# Patient Record
Sex: MALE | Race: AMERICAN INDIAN OR ALASKA NATIVE
[De-identification: names, ages, dates, MRNs, and addresses within clinical notes are randomized per-mention and may not be internally consistent; named-entity substitution may affect disease eponyms.]

---

## 2017-08-04 ENCOUNTER — HOSPITAL ENCOUNTER (EMERGENCY)
Dept: HOSPITAL 56 - MW.ED | Age: 47
Discharge: HOME | End: 2017-08-04
Payer: COMMERCIAL

## 2017-08-04 VITALS — DIASTOLIC BLOOD PRESSURE: 88 MMHG | SYSTOLIC BLOOD PRESSURE: 131 MMHG
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
## 2017-08-04 DIAGNOSIS — S20.212A: Primary | ICD-10-CM

## 2017-08-04 DIAGNOSIS — I10: ICD-10-CM

## 2017-08-04 DIAGNOSIS — F41.9: ICD-10-CM

## 2017-08-04 PROCEDURE — 96372 THER/PROPH/DIAG INJ SC/IM: CPT

## 2017-08-04 PROCEDURE — 71101 X-RAY EXAM UNILAT RIBS/CHEST: CPT

## 2017-08-04 PROCEDURE — 99283 EMERGENCY DEPT VISIT LOW MDM: CPT

## 2017-08-04 NOTE — CR
EXAMINATION: PA chest radiograph and left ribs

 

HISTORY: Pain.

 

FINDINGS: 

The trachea is midline. The cardiomediastinal silhouette is within normal limits. No pulmonary infil
trates, effusions or pneumothorax.

 

Osseous structures appear unremarkable. No displaced rib fracture identified.

 

IMPRESSION: 

No acute cardiopulmonary process.
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
## 2017-08-06 ENCOUNTER — HOSPITAL ENCOUNTER (EMERGENCY)
Dept: HOSPITAL 56 - MW.ED | Age: 47
LOS: 1 days | Discharge: HOME | End: 2017-08-07
Payer: MEDICAID

## 2017-08-06 DIAGNOSIS — I10: ICD-10-CM

## 2017-08-06 DIAGNOSIS — S29.9XXA: ICD-10-CM

## 2017-08-06 DIAGNOSIS — S30.1XXA: Primary | ICD-10-CM
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
## 2017-08-06 LAB
CHLORIDE SERPL-SCNC: 104 MMOL/L (ref 98–110)
SODIUM SERPL-SCNC: 140 MMOL/L (ref 136–146)

## 2017-08-06 PROCEDURE — 71010: CPT

## 2017-08-06 PROCEDURE — 99284 EMERGENCY DEPT VISIT MOD MDM: CPT

## 2017-08-06 PROCEDURE — 85025 COMPLETE CBC W/AUTO DIFF WBC: CPT

## 2017-08-06 PROCEDURE — 71100 X-RAY EXAM RIBS UNI 2 VIEWS: CPT

## 2017-08-06 PROCEDURE — 96361 HYDRATE IV INFUSION ADD-ON: CPT

## 2017-08-06 PROCEDURE — 96375 TX/PRO/DX INJ NEW DRUG ADDON: CPT

## 2017-08-06 PROCEDURE — 74177 CT ABD & PELVIS W/CONTRAST: CPT

## 2017-08-06 PROCEDURE — 80053 COMPREHEN METABOLIC PANEL: CPT

## 2017-08-06 PROCEDURE — 96374 THER/PROPH/DIAG INJ IV PUSH: CPT

## 2017-08-06 PROCEDURE — 83690 ASSAY OF LIPASE: CPT

## 2017-08-06 PROCEDURE — 81001 URINALYSIS AUTO W/SCOPE: CPT
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
## 2017-08-07 VITALS — DIASTOLIC BLOOD PRESSURE: 79 MMHG | SYSTOLIC BLOOD PRESSURE: 170 MMHG

## 2017-08-07 NOTE — CR
EXAM DATE: 17



PATIENT'S AGE: 46





Patient: SHER REYNOLDS



Facility: [place], [place]

Patient ID: [id number]

Site Patient ID: [id number].

Site Accession #: [id number]

: 1970

Study: XRay Chest [id number]/7/2017 1:43:48 AM

Ordering Physician: Tha Valdez



Final Report: 

INDICATION: LUQ INJURY 3 DAYS



CHEST, ONE VIEW



An AP radiograph of the chest was performed. 



Comparison: 2017.



The lungs appear clear and no pleural effusions are identified. The 
cardiomediastinal silhouette and pulmonary vasculature appear normal, as do the 
visualized bones.



IMPRESSION: No acute intrathoracic abnormality identified.



KATE QUIJANO MD

[facility]













Dictated by: Demario Quijano MD @ 2017 01:47:10

(Electronic Signature)



Report Signed by Proxy.
[facility]

## 2017-08-07 NOTE — CT
EXAM DATE: 17



PATIENT'S AGE: 46





Patient: SHER REYNOLDS



Facility: [place], [place]

Patient ID: [id number]

Site Patient ID: [id number].

Site Accession #: [id number]

: 1970

Study: CT Abdomen/Pelvis [id number]/7/2017 12:24:51 AM

Ordering Physician: Tha Valdez



Final Report: 

:

INDICATION: INJURY TO LUQ 3 DAYS AGO



CT ABDOMEN AND PELVIS WITH CONTRAST



TECHNIQUE: Multidetector CT imaging was performed through the abdomen and 
pelvis following intravenous contrast administration using 100 mL of Isovue 
370. Coronal and sagittal reconstructions were generated.



COMPARISON: 05/15/2017 CT abdomen and pelvis.



FINDINGS:



Lower chest: Mild bibasilar lung stranding consistent with atelectasis.



Liver: Fatty infiltration of the liver. 



Gallbladder and bile ducts: No gallbladder wall thickening or calcified 
gallstones. No biliary dilation identified.



Pancreas: Unremarkable.



Spleen: Normal.



Adrenals: No nodules or masses.



Kidneys, ureters, and urinary bladder: Tiny right renal cortical cysts. No 
renal masses or hydronephrosis. No bladder mass or definite wall thickening.



Gastrointestinal tract: Normal caliber bowel without wall thickening. The 
appendix is normal.



Vascular structures: Normal for age.



Peritoneum: No free air, abscess, or significant free fluid.



Lymph nodes: No pathologically enlarged nodes identified.



Reproductive organs: No pelvic masses.



Bones: No acute fracture identified. Incidental Schmorl`s node in the superior 
endplate of T11, as before.



IMPRESSION:

1. No acute abnormality identified. No fractures are seen.

2. Fatty infiltration of liver. 



KATE QUIJANO MD

[facility]



Dictated by Demario Quijano MD @ 2017 12:50:31 AM





Dictated by: Demario Quijano MD @ 2017 00:50:49

(Electronic Signature)



Report Signed by Proxy.
[facility]

## 2017-08-08 NOTE — CR
EXAM DATE: 17



PATIENT'S AGE: 46





Patient: SHER REYNOLDS



Facility: [place], [place]

Patient ID: [id number]

Site Patient ID: [id number]

Site Accession #: [id number]

: 1970

Study: XRay Chest Left Ribs [id number]/6/2017 4:09:44 PM

Ordering Physician: JIGNA TOLEDO 



Final Report: 

INDICATION: PAIN



FINDINGS:

Three views of the right ribs show no evidence of a rib fracture. No other bony 
or soft tissue abnormalities identified.



Dictated by Henrique Garcia MD @ 2017 4:22:07 PM





Dictated by: Henrique Garcia MD @ 2017 16:22:13

(Electronic Signature)

[facility]

## 2017-12-23 ENCOUNTER — HOSPITAL ENCOUNTER (EMERGENCY)
Dept: HOSPITAL 56 - MW.ED | Age: 47
Discharge: HOME | End: 2017-12-23
Payer: MEDICAID

## 2017-12-23 VITALS — DIASTOLIC BLOOD PRESSURE: 79 MMHG | SYSTOLIC BLOOD PRESSURE: 142 MMHG

## 2017-12-23 DIAGNOSIS — M62.838: Primary | ICD-10-CM

## 2017-12-23 DIAGNOSIS — Z79.899: ICD-10-CM

## 2017-12-23 DIAGNOSIS — I10: ICD-10-CM

## 2017-12-23 LAB
CHLORIDE SERPL-SCNC: 105 MMOL/L (ref 98–110)
SODIUM SERPL-SCNC: 139 MMOL/L (ref 136–146)

## 2017-12-23 PROCEDURE — 99285 EMERGENCY DEPT VISIT HI MDM: CPT

## 2017-12-23 PROCEDURE — 96360 HYDRATION IV INFUSION INIT: CPT

## 2017-12-23 PROCEDURE — 80053 COMPREHEN METABOLIC PANEL: CPT

## 2017-12-23 PROCEDURE — 71010: CPT

## 2017-12-23 PROCEDURE — 83690 ASSAY OF LIPASE: CPT

## 2017-12-23 PROCEDURE — 82150 ASSAY OF AMYLASE: CPT

## 2017-12-23 PROCEDURE — 85025 COMPLETE CBC W/AUTO DIFF WBC: CPT

## 2017-12-23 PROCEDURE — 81001 URINALYSIS AUTO W/SCOPE: CPT

## 2017-12-23 PROCEDURE — 84484 ASSAY OF TROPONIN QUANT: CPT

## 2017-12-23 PROCEDURE — G0480 DRUG TEST DEF 1-7 CLASSES: HCPCS

## 2017-12-23 NOTE — EDM.PDOC
ED HPI GENERAL MEDICAL PROBLEM





- General


Stated Complaint: LEFT SHOULDER PAIN


Time Seen by Provider: 12/23/17 05:10


Source of Information: Reports: Patient





- History of Present Illness


INITIAL COMMENTS - FREE TEXT/NARRATIVE: 





HISTORY AND PHYSICAL:


History of present illness:


[Patient presents with 2 days of 4 out of 10 left shoulder girdle pain, it does 

radiate across his chest today, it is reproducible, I can reproduce symptoms 

with palpation and movement of the left arm, patient works in packaging for the 

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 

I can reproduce pain with palpation of the infraspinatus as well as thoracic 

paraspinous muscles on the left and pectoralis major on the left. No 

association with shortness of breath or diaphoresis patient does feel under the 

weather however generally weak with a wobbly gait.





Some mild nausea no vomiting no fever chills sweats no headache dizziness or 

palpitation no bowel or urine symptoms





Denies cardiac history





Denies smoking or alcohol use





He does take lisinopril for hypertension





]


Review of systems: 


As per history of present illness and below otherwise all systems reviewed and 

negative.


Past medical history: 


As per history of present illness and as reviewed below otherwise 

noncontributory.


Surgical history: 


As per history of present illness and as reviewed below otherwise 

noncontributory.


Social history: 


No reported history of drug or alcohol abuse.


Family history: 


As per history of present illness and as reviewed below otherwise 

noncontributory.


Physical exam:


HEENT: Atraumatic, normocephalic, pupils reactive, negative for conjunctival 

pallor or scleral icterus, mucous membranes moist, throat clear, neck supple, 

nontender, trachea midline.


Lungs: Clear to auscultation, breath sounds equal bilaterally, chest nontender.


Heart: S1S2, regular, negative for clicks, rubs, or JVD.


Abdomen: Soft, nondistended, nontender. Negative for masses or 

hepatosplenomegaly. Negative for costovertebral tenderness.


Pelvis: Stable nontender.


Genitourinary: Deferred.


Rectal: Deferred.


Extremities: Atraumatic, negative for cords or calf pain. Neurovascular 

unremarkable.


Neuro: Awake, alert, oriented. Cranial nerves II through XII unremarkable. 

Cerebellum unremarkable. Motor and sensory unremarkable throughout. Exam 

nonfocal.


Diagnostics:


[CBC CMP UA troponin


EKG


Chest 1 view





]


Therapeutics:


[Liter normal saline bolus


Aspirin 324 mg chewable


Nitroglycerin 0.4 mg sublingual--not provided





] Due to wobbly gait I did offer the patient admission for observation he 

refused stating he would rather go home and return as needed








Impression: 


Muscle spasm left shoulder girdle





Definitive disposition and diagnosis as appropriate pending reevaluation and 

review of above.


  ** left shoulder/chest


Pain Score (Numeric/FACES): 8





- Related Data


 Allergies











Allergy/AdvReac Type Severity Reaction Status Date / Time


 


COCA COLA Allergy  Hives Uncoded 12/23/17 05:10











Home Meds: 


 Home Meds





Lisinopril [Prinivil] 10 mg PO DAILY 09/12/15 [History]


Ibuprofen [Ibuprofen] 1 tab PO ASDIRECTED PRN 12/23/17 [History]











Past Medical History


HEENT History: Reports: None


Cardiovascular History: Reports: Hypertension


Gastrointestinal History: Reports: Bowel Obstruction


Genitourinary History: Reports: Other (See Below)


Other Genitourinary History: hematuria once, none since


Musculoskeletal History: Reports: None


Neurological History: Reports: None


Psychiatric History: Reports: Anxiety


Endocrine/Metabolic History: Reports: None


Hematologic History: Reports: None


Immunologic History: Reports: None


Oncologic (Cancer) History: Reports: None


Dermatologic History: Reports: None





- Infectious Disease History


Infectious Disease History: Reports: None





- Past Surgical History


Head Surgeries/Procedures: Reports: None


Male  Surgical History: Reports: None





Social & Family History





- Family History


Family Medical History: Noncontributory


Cardiac: Reports: MI


Respiratory: Reports: None


GI: Reports: None


Endocrine/Metabolic: Reports: Diabetes, type II


Oncologic: Reports: Colon





- Tobacco Use


Smoking Status *Q: Never Smoker


Second Hand Smoke Exposure: No





- Caffeine Use


Caffeine Use: Reports: Coffee, Soda





- Recreational Drug Use


Recreational Drug Use: No





ED ROS GENERAL





- Review of Systems


Review Of Systems: ROS reveals no pertinent complaints other than HPI.





ED EXAM, GENERAL





- Physical Exam


Exam: See Below





Course





- Vital Signs


Last Recorded V/S: 


 Last Vital Signs











Temp  97.6 F   12/23/17 05:11


 


Pulse  65   12/23/17 05:11


 


Resp  18   12/23/17 05:11


 


BP  122/76   12/23/17 05:11


 


Pulse Ox  97   12/23/17 05:11














- Orders/Labs/Meds


Orders: 


 Active Orders 24 hr











 Category Date Time Status


 


 EKG Documentation Completion [RC] AM Care  12/23/17 05:09 Active


 


 Chest 1V Frontal [CR] Stat Exams  12/23/17 05:09 Taken


 


 Nitroglycerin [Nitrostat] Med  12/23/17 05:09 Active





 0.4 mg SL Q5M PRN   








 Medication Orders





Nitroglycerin (Nitrostat)  0.4 mg SL Q5M PRN


   PRN Reason: Chest Pain








Labs: 


 Laboratory Tests











  12/23/17 12/23/17 12/23/17 Range/Units





  05:13 05:13 05:25 


 


WBC  7.76    (4.0-11.0)  K/uL


 


RBC  4.84    (4.50-5.90)  M/uL


 


Hgb  14.3    (13.0-17.0)  g/dL


 


Hct  42.9    (38.0-50.0)  %


 


MCV  88.6    (80.0-98.0)  fL


 


MCH  29.5    (27.0-32.0)  pg


 


MCHC  33.3    (31.0-37.0)  g/dL


 


RDW Std Deviation  45.8    (28.0-62.0)  fl


 


RDW Coeff of Vin  14    (11.0-15.0)  %


 


Plt Count  206    (150-400)  K/uL


 


MPV  9.90    (7.40-12.00)  fL


 


Neut % (Auto)  59.6    (48.0-80.0)  %


 


Lymph % (Auto)  28.4    (16.0-40.0)  %


 


Mono % (Auto)  9.4    (0.0-15.0)  %


 


Eos % (Auto)  2.1    (0.0-7.0)  %


 


Baso % (Auto)  0.5    (0.0-1.5)  %


 


Neut # (Auto)  4.6    (1.4-5.7)  K/uL


 


Lymph # (Auto)  2.2    (0.6-2.4)  K/uL


 


Mono # (Auto)  0.7    (0.0-0.8)  K/uL


 


Eos # (Auto)  0.2    (0.0-0.7)  K/uL


 


Baso # (Auto)  0.0    (0.0-0.1)  K/uL


 


Nucleated RBC %  0.0    /100WBC


 


Nucleated RBCs #  0    K/uL


 


Sodium   139   (136-146)  mmol/L


 


Potassium   3.6   (3.5-5.1)  mmol/L


 


Chloride   105   ()  mmol/L


 


Carbon Dioxide   25   (21-31)  mmol/L


 


BUN   16   (6.0-23.0)  mg/dL


 


Creatinine   0.8   (0.6-1.5)  mg/dL


 


Est Cr Clr Drug Dosing   126.23   mL/min


 


Estimated GFR (MDRD)   > 60.0   ml/min


 


Glucose   123 H   ()  mg/dL


 


Calcium   9.0   (8.8-10.8)  mg/dL


 


Total Bilirubin   0.9   (0.1-1.5)  mg/dL


 


AST   15   (5-40)  IU/L


 


ALT   14   (8-54)  IU/L


 


Alkaline Phosphatase   102   ()  


 


Troponin I   < 0.10   (0.0-0.29)  NG/ML


 


Total Protein   7.0   (6.0-8.0)  g/dL


 


Albumin   4.1   (3.5-5.0)  g/dL


 


Globulin   2.9   (2.0-3.5)  g/dL


 


Albumin/Globulin Ratio   1.4   (1.3-2.8)  


 


Amylase   19   (10-90)  U/L


 


Lipase   23   (7-80)  U/L


 


Urine Color    YELLOW  


 


Urine Appearance    CLEAR  


 


Urine pH    6.0  (5.0-8.0)  


 


Ur Specific Gravity    1.025  (1.001-1.035)  


 


Urine Protein    NEGATIVE  (NEGATIVE)  mg/dL


 


Urine Glucose (UA)    NEGATIVE  (NEGATIVE)  mg/dL


 


Urine Ketones    NEGATIVE  (NEGATIVE)  mg/dL


 


Urine Occult Blood    MODERATE  (NEGATIVE)  


 


Urine Nitrite    NEGATIVE  (NEGATIVE)  


 


Urine Bilirubin    NEGATIVE  (NEGATIVE)  


 


Urine Urobilinogen    4.0 H  (<2.0)  EU/dL


 


Ur Leukocyte Esterase    NEGATIVE  (NEGATIVE)  


 


Urine RBC    2-5  (0-2/HPF)  


 


Urine WBC    1-2  (0-5/HPF)  


 


Ur Epithelial Cells    FEW  (NONE-FEW)  


 


Urine Bacteria    FEW  (NEGATIVE)  


 


Urine Mucus    FEW  (NONE-MOD)  


 


Ethyl Alcohol     mg/dL














  12/23/17 Range/Units





  05:31 


 


WBC   (4.0-11.0)  K/uL


 


RBC   (4.50-5.90)  M/uL


 


Hgb   (13.0-17.0)  g/dL


 


Hct   (38.0-50.0)  %


 


MCV   (80.0-98.0)  fL


 


MCH   (27.0-32.0)  pg


 


MCHC   (31.0-37.0)  g/dL


 


RDW Std Deviation   (28.0-62.0)  fl


 


RDW Coeff of Vin   (11.0-15.0)  %


 


Plt Count   (150-400)  K/uL


 


MPV   (7.40-12.00)  fL


 


Neut % (Auto)   (48.0-80.0)  %


 


Lymph % (Auto)   (16.0-40.0)  %


 


Mono % (Auto)   (0.0-15.0)  %


 


Eos % (Auto)   (0.0-7.0)  %


 


Baso % (Auto)   (0.0-1.5)  %


 


Neut # (Auto)   (1.4-5.7)  K/uL


 


Lymph # (Auto)   (0.6-2.4)  K/uL


 


Mono # (Auto)   (0.0-0.8)  K/uL


 


Eos # (Auto)   (0.0-0.7)  K/uL


 


Baso # (Auto)   (0.0-0.1)  K/uL


 


Nucleated RBC %   /100WBC


 


Nucleated RBCs #   K/uL


 


Sodium   (136-146)  mmol/L


 


Potassium   (3.5-5.1)  mmol/L


 


Chloride   ()  mmol/L


 


Carbon Dioxide   (21-31)  mmol/L


 


BUN   (6.0-23.0)  mg/dL


 


Creatinine   (0.6-1.5)  mg/dL


 


Est Cr Clr Drug Dosing   mL/min


 


Estimated GFR (MDRD)   ml/min


 


Glucose   ()  mg/dL


 


Calcium   (8.8-10.8)  mg/dL


 


Total Bilirubin   (0.1-1.5)  mg/dL


 


AST   (5-40)  IU/L


 


ALT   (8-54)  IU/L


 


Alkaline Phosphatase   ()  


 


Troponin I   (0.0-0.29)  NG/ML


 


Total Protein   (6.0-8.0)  g/dL


 


Albumin   (3.5-5.0)  g/dL


 


Globulin   (2.0-3.5)  g/dL


 


Albumin/Globulin Ratio   (1.3-2.8)  


 


Amylase   (10-90)  U/L


 


Lipase   (7-80)  U/L


 


Urine Color   


 


Urine Appearance   


 


Urine pH   (5.0-8.0)  


 


Ur Specific Gravity   (1.001-1.035)  


 


Urine Protein   (NEGATIVE)  mg/dL


 


Urine Glucose (UA)   (NEGATIVE)  mg/dL


 


Urine Ketones   (NEGATIVE)  mg/dL


 


Urine Occult Blood   (NEGATIVE)  


 


Urine Nitrite   (NEGATIVE)  


 


Urine Bilirubin   (NEGATIVE)  


 


Urine Urobilinogen   (<2.0)  EU/dL


 


Ur Leukocyte Esterase   (NEGATIVE)  


 


Urine RBC   (0-2/HPF)  


 


Urine WBC   (0-5/HPF)  


 


Ur Epithelial Cells   (NONE-FEW)  


 


Urine Bacteria   (NEGATIVE)  


 


Urine Mucus   (NONE-MOD)  


 


Ethyl Alcohol  < 10.0  mg/dL











Meds: 


Medications











Generic Name Dose Route Start Last Admin





  Trade Name Freq  PRN Reason Stop Dose Admin


 


Nitroglycerin  0.4 mg  12/23/17 05:09  





  Nitrostat  SL   





  Q5M PRN   





  Chest Pain   














Discontinued Medications














Generic Name Dose Route Start Last Admin





  Trade Name Freq  PRN Reason Stop Dose Admin


 


Aspirin  324 mg  12/23/17 05:09  12/23/17 05:17





  Aspirin  PO  12/23/17 05:10  324 mg





  ONETIME ONE   Administration


 


Sodium Chloride  1,000 mls @ 999 mls/hr  12/23/17 05:09  12/23/17 05:17





  Normal Saline  IV  12/23/17 06:09  999 mls/hr





  STAT ONE   Administration














Departure





- Departure


Time of Disposition: 06:36


Disposition: Home, Self-Care 01


Condition: Good


Clinical Impression: 


 Muscle spasm








- Discharge Information


Additional Instructions: 


Return if symptoms persist or worsen or new concerning symptoms develop


Follow-up with primary care in 2 weeks sooner as needed





The following information is given to patients seen in the emergency department 

who are being discharged to home. This information is to outline your options 

for follow-up care. We provide all patients seen in our emergency department 

with a follow-up referral.





The need for follow-up, as well as the timing and circumstances, are variable 

depending upon the specifics of your emergency department visit.





If you don't have a primary care physician on staff, we will provide you with a 

referral. We always advise you to contact your personal physician following an 

emergency department visit to inform them of the circumstance of the visit and 

for follow-up with them and/or the need for any referrals to a consulting 

specialist.





The emergency department will also refer you to a specialist when appropriate. 

This referral assures that you have the opportunity for follow-up care with a 

specialist. All of these measure are taken in an effort to provide you with 

optimal care, which includes your follow-up.





Under all circumstances we always encourage you to contact your private 

physician who remains a resource for coordinating your care. When calling for 

follow-up care, please make the office aware that this follow-up is from your 

recent emergency room visit. If for any reason you are refused follow-up, 

please contact the [facility] emergency department at [phone] 

and asked to speak to the emergency department charge nurse.








- My Orders


Last 24 Hours: 


My Active Orders





12/23/17 05:09


EKG Documentation Completion [RC] AM 


Chest 1V Frontal [CR] Stat 


Nitroglycerin [Nitrostat]   0.4 mg SL Q5M PRN 














- Assessment/Plan


Last 24 Hours: 


My Active Orders





12/23/17 05:09


EKG Documentation Completion [RC] AM 


Chest 1V Frontal [CR] Stat 


Nitroglycerin [Nitrostat]   0.4 mg SL Q5M PRN

## 2017-12-26 NOTE — CR
EXAM DATE: 17



PATIENT'S AGE: 47





Patient: SHER REYNOLDS



Facility: [place], [place]

Patient ID: [id number]

Site Patient ID: [id number].

Site Accession #: [id number]

: 1970

Study: XRay Chest [id number]/23/2017 5:25:55 AM

Ordering Physician: Doctor Neal



Final Report: 

INDICATION:

Pain 



TECHNIQUE:

Chest 1 views



COMPARISON:

2017 



FINDINGS:

Cardiovascular and mediastinum: Heart size and vasculature are normal in 
caliber and appearance. 

Lungs and pleural spaces: Lungs are clear. No sign of infiltrate or mass. No 
sign of pleural effusion. No pneumothorax. 

Bones and soft tissues: No significant findings.



IMPRESSION:

No acute findings and no significant changes from the prior exam.



Dictated by Renard Call MD @ 2017 5:34:57 AM







Dictated by: Renard Call MD @ 2017 05:35:03

(Electronic Signature)



Report Signed by Proxy.
DESIREE

## 2022-08-24 ENCOUNTER — HOSPITAL ENCOUNTER (EMERGENCY)
Dept: HOSPITAL 56 - MW.ED | Age: 52
LOS: 1 days | Discharge: HOME | End: 2022-08-25
Payer: SELF-PAY

## 2022-08-24 DIAGNOSIS — Z20.822: ICD-10-CM

## 2022-08-24 DIAGNOSIS — Z79.899: ICD-10-CM

## 2022-08-24 DIAGNOSIS — K59.00: Primary | ICD-10-CM

## 2022-08-24 DIAGNOSIS — Z91.048: ICD-10-CM

## 2022-08-24 DIAGNOSIS — I10: ICD-10-CM

## 2022-08-24 PROCEDURE — 96374 THER/PROPH/DIAG INJ IV PUSH: CPT

## 2022-08-24 PROCEDURE — 99284 EMERGENCY DEPT VISIT MOD MDM: CPT

## 2022-08-24 PROCEDURE — 81001 URINALYSIS AUTO W/SCOPE: CPT

## 2022-08-24 PROCEDURE — 74177 CT ABD & PELVIS W/CONTRAST: CPT

## 2022-08-24 PROCEDURE — 36415 COLL VENOUS BLD VENIPUNCTURE: CPT

## 2022-08-24 PROCEDURE — U0002 COVID-19 LAB TEST NON-CDC: HCPCS

## 2022-08-24 PROCEDURE — 87635 SARS-COV-2 COVID-19 AMP PRB: CPT

## 2022-08-24 PROCEDURE — 96361 HYDRATE IV INFUSION ADD-ON: CPT

## 2022-08-24 PROCEDURE — 85025 COMPLETE CBC W/AUTO DIFF WBC: CPT

## 2022-08-24 PROCEDURE — 80053 COMPREHEN METABOLIC PANEL: CPT

## 2022-08-25 VITALS — HEART RATE: 87 BPM | SYSTOLIC BLOOD PRESSURE: 131 MMHG | DIASTOLIC BLOOD PRESSURE: 95 MMHG

## 2022-08-25 LAB
BUN SERPL-MCNC: 13 MG/DL (ref 7–18)
CHLORIDE SERPL-SCNC: 101 MMOL/L (ref 98–107)
CO2 SERPL-SCNC: 26.9 MMOL/L (ref 21–32)
EGFRCR SERPLBLD CKD-EPI 2021: 90 ML/MIN (ref 60–?)
GLUCOSE SERPL-MCNC: 142 MG/DL (ref 74–106)
POTASSIUM SERPL-SCNC: 3.7 MMOL/L (ref 3.5–5.1)
SODIUM SERPL-SCNC: 137 MMOL/L (ref 136–148)

## 2022-09-30 ENCOUNTER — HOSPITAL ENCOUNTER (EMERGENCY)
Dept: HOSPITAL 56 - MW.ED | Age: 52
Discharge: HOME | End: 2022-09-30
Payer: SELF-PAY

## 2022-09-30 VITALS — HEART RATE: 60 BPM | DIASTOLIC BLOOD PRESSURE: 83 MMHG | SYSTOLIC BLOOD PRESSURE: 140 MMHG

## 2022-09-30 DIAGNOSIS — Z91.018: ICD-10-CM

## 2022-09-30 DIAGNOSIS — J01.00: Primary | ICD-10-CM

## 2022-09-30 DIAGNOSIS — Z79.899: ICD-10-CM

## 2022-09-30 DIAGNOSIS — I10: ICD-10-CM

## 2022-11-07 ENCOUNTER — HOSPITAL ENCOUNTER (EMERGENCY)
Dept: HOSPITAL 56 - MW.ED | Age: 52
Discharge: HOME | End: 2022-11-07
Payer: COMMERCIAL

## 2022-11-07 VITALS — SYSTOLIC BLOOD PRESSURE: 145 MMHG | DIASTOLIC BLOOD PRESSURE: 96 MMHG | HEART RATE: 68 BPM

## 2022-11-07 DIAGNOSIS — Z91.018: ICD-10-CM
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
## 2022-11-07 DIAGNOSIS — Z79.899: ICD-10-CM

## 2022-11-07 DIAGNOSIS — S09.90XA: Primary | ICD-10-CM

## 2022-11-07 DIAGNOSIS — I10: ICD-10-CM

## 2022-11-07 PROCEDURE — 99283 EMERGENCY DEPT VISIT LOW MDM: CPT

## 2023-07-27 ENCOUNTER — HOSPITAL ENCOUNTER (EMERGENCY)
Dept: HOSPITAL 56 - MW.ED | Age: 53
Discharge: HOME | End: 2023-07-27
Payer: COMMERCIAL

## 2023-07-27 VITALS — SYSTOLIC BLOOD PRESSURE: 158 MMHG | DIASTOLIC BLOOD PRESSURE: 86 MMHG | HEART RATE: 55 BPM
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
## 2023-07-27 DIAGNOSIS — S60.042A: Primary | ICD-10-CM

## 2023-07-27 DIAGNOSIS — Z91.018: ICD-10-CM

## 2023-07-27 DIAGNOSIS — Z79.899: ICD-10-CM

## 2023-07-27 DIAGNOSIS — S60.052A: ICD-10-CM

## 2023-07-27 DIAGNOSIS — I10: ICD-10-CM
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
## 2023-07-27 PROCEDURE — 73130 X-RAY EXAM OF HAND: CPT

## 2023-07-27 PROCEDURE — 99283 EMERGENCY DEPT VISIT LOW MDM: CPT

## 2024-08-02 ENCOUNTER — HOSPITAL ENCOUNTER (EMERGENCY)
Dept: HOSPITAL 56 - MW.ED | Age: 54
Discharge: HOME | End: 2024-08-02
Payer: COMMERCIAL

## 2024-08-02 VITALS — DIASTOLIC BLOOD PRESSURE: 87 MMHG | SYSTOLIC BLOOD PRESSURE: 147 MMHG | HEART RATE: 87 BPM

## 2024-08-02 DIAGNOSIS — T63.461A: Primary | ICD-10-CM

## 2024-08-02 DIAGNOSIS — I10: ICD-10-CM

## 2024-08-02 DIAGNOSIS — Z91.018: ICD-10-CM

## 2024-08-02 DIAGNOSIS — Z75.8: ICD-10-CM

## 2024-08-02 PROCEDURE — 99282 EMERGENCY DEPT VISIT SF MDM: CPT

## 2024-10-15 ENCOUNTER — HOSPITAL ENCOUNTER (EMERGENCY)
Dept: HOSPITAL 56 - MW.ED | Age: 54
LOS: 1 days | Discharge: HOME | End: 2024-10-16
Payer: COMMERCIAL

## 2024-10-15 DIAGNOSIS — Z91.048: ICD-10-CM

## 2024-10-15 DIAGNOSIS — Z75.8: ICD-10-CM

## 2024-10-15 DIAGNOSIS — R10.84: Primary | ICD-10-CM

## 2024-10-15 DIAGNOSIS — I10: ICD-10-CM

## 2024-10-15 DIAGNOSIS — Z79.899: ICD-10-CM

## 2024-10-15 LAB
ALBUMIN SERPL-MCNC: 3.5 G/DL (ref 3.4–5)
ALBUMIN/GLOB SERPL: 0.9 {RATIO} (ref 0.9–1.6)
ALP SERPL-CCNC: 89 U/L (ref 46–116)
ALT SERPL-CCNC: 18 IU/L (ref 14–63)
APPEARANCE UR: CLEAR
AST SERPL-CCNC: 11 IU/L (ref 15–37)
BACTERIA URNS QL MICRO: (no result)
BASOPHILS # BLD AUTO: 0.08 K/UL (ref 0–0.2)
BASOPHILS NFR BLD AUTO: 0.9 % (ref 0–1)
BILIRUB SERPL-MCNC: 0.7 MG/DL (ref 0.2–1)
BILIRUB UR STRIP-MCNC: NEGATIVE MG/DL
BUN SERPL-MCNC: 15 MG/DL (ref 7–18)
CALCIUM SERPL-MCNC: 8.9 MG/DL (ref 8.5–10.1)
CHLORIDE SERPL-SCNC: 99 MMOL/L (ref 98–107)
CO2 SERPL-SCNC: 30.7 MMOL/L (ref 21–32)
COLOR UR: YELLOW
CREAT CL 24H UR+SERPL-VRATE: 72.56 ML/MIN
CREAT SERPL-MCNC: 1.3 MG/DL (ref 0.8–1.3)
EGFRCR SERPLBLD CKD-EPI 2021: 65 ML/MIN (ref 60–?)
EOSINOPHIL # BLD AUTO: 0.1 K/UL (ref 0–0.45)
EOSINOPHIL NFR BLD AUTO: 1.1 % (ref 0–6)
EPI CELLS #/AREA URNS HPF: (no result) /[HPF]
GLOBULIN SER-MCNC: 3.7 G/DL (ref 2.6–4)
GLUCOSE SERPL-MCNC: 174 MG/DL (ref 74–106)
GLUCOSE UR STRIP-MCNC: NEGATIVE MG/DL
HCT VFR BLD AUTO: 42.1 % (ref 42–52)
HGB BLD-MCNC: 14.6 G/DL (ref 14–18)
IMM GRANULOCYTES # BLD: 0.04 K/UL (ref 0–0.05)
IMM GRANULOCYTES NFR BLD: 0.4 % (ref 0–0.4)
KETONES UR STRIP-MCNC: NEGATIVE MG/DL
LIPASE SERPL-CCNC: 24 U/L (ref 16–77)
LYMPHOCYTES # BLD AUTO: 1.94 K/UL (ref 1–4.8)
LYMPHOCYTES NFR BLD AUTO: 20.8 % (ref 24–44)
MCH RBC QN AUTO: 29.9 PG (ref 28–32)
MCHC RBC AUTO-ENTMCNC: 34.7 G/DL (ref 32–36)
MCHC RBC AUTO-ENTMCNC: 86.1 FL (ref 83–99)
MONOCYTES # BLD AUTO: 0.61 K/UL (ref 0–0.8)
MONOCYTES NFR BLD AUTO: 6.5 % (ref 0–8)
NEUTROPHILS # BLD AUTO: 6.57 K/UL (ref 1.8–7.7)
NEUTROPHILS NFR BLD AUTO: 70.3 % (ref 41–71)
NITRITE UR QL: NEGATIVE
NRBC BLD AUTO-RTO: 0 /100WBC (ref 0–0.2)
NRBC BLD AUTO-RTO: 0 K/UL (ref 0–0.02)
PH UR STRIP: 6 [PH] (ref 5–8)
PLATELET # BLD AUTO: 212 K/UL (ref 150–400)
PMV BLD AUTO: 9.3 FL (ref 9.4–12.4)
POTASSIUM SERPL-SCNC: 3.5 MMOL/L (ref 3.5–5.1)
PROT SERPL-MCNC: 7.2 G/DL (ref 6.4–8.2)
PROT UR STRIP-MCNC: NEGATIVE MG/DL
RBC # BLD AUTO: 4.89 M/UL (ref 4.52–5.9)
RBC # URNS HPF: (no result) /ML
RBC UR QL: (no result)
SODIUM SERPL-SCNC: 137 MMOL/L (ref 136–148)
SP GR UR STRIP: 1.01 (ref 1–1.03)
UROBILINOGEN UR STRIP-ACNC: 0.2 EU/DL (ref ?–2)
WBC # BLD AUTO: 9.34 K/UL (ref 3.9–11.3)
WBC UR QL: (no result)

## 2024-10-15 PROCEDURE — 96361 HYDRATE IV INFUSION ADD-ON: CPT

## 2024-10-15 PROCEDURE — 85025 COMPLETE CBC W/AUTO DIFF WBC: CPT

## 2024-10-15 PROCEDURE — 83690 ASSAY OF LIPASE: CPT

## 2024-10-15 PROCEDURE — 96374 THER/PROPH/DIAG INJ IV PUSH: CPT

## 2024-10-15 PROCEDURE — 81001 URINALYSIS AUTO W/SCOPE: CPT

## 2024-10-15 PROCEDURE — 74177 CT ABD & PELVIS W/CONTRAST: CPT

## 2024-10-15 PROCEDURE — 96375 TX/PRO/DX INJ NEW DRUG ADDON: CPT

## 2024-10-15 PROCEDURE — 99284 EMERGENCY DEPT VISIT MOD MDM: CPT

## 2024-10-15 PROCEDURE — 80053 COMPREHEN METABOLIC PANEL: CPT

## 2024-10-15 PROCEDURE — 36415 COLL VENOUS BLD VENIPUNCTURE: CPT

## 2024-10-15 RX ADMIN — IOPAMIDOL ONE ML: 755 INJECTION, SOLUTION INTRAVENOUS at 23:52

## 2024-10-15 RX ADMIN — ONDANSETRON ONE MG: 2 INJECTION, SOLUTION INTRAMUSCULAR; INTRAVENOUS at 22:52

## 2024-10-16 VITALS — HEART RATE: 51 BPM | SYSTOLIC BLOOD PRESSURE: 152 MMHG | DIASTOLIC BLOOD PRESSURE: 84 MMHG
